# Patient Record
Sex: MALE | Race: WHITE | Employment: UNEMPLOYED | URBAN - METROPOLITAN AREA
[De-identification: names, ages, dates, MRNs, and addresses within clinical notes are randomized per-mention and may not be internally consistent; named-entity substitution may affect disease eponyms.]

---

## 2024-08-30 ENCOUNTER — HOSPITAL ENCOUNTER (EMERGENCY)
Age: 34
Discharge: HOME | End: 2024-08-30
Attending: EMERGENCY MEDICINE

## 2024-08-30 VITALS
SYSTOLIC BLOOD PRESSURE: 98 MMHG | BODY MASS INDEX: 28.44 KG/M2 | HEART RATE: 109 BPM | HEIGHT: 72 IN | WEIGHT: 210 LBS | RESPIRATION RATE: 16 BRPM | TEMPERATURE: 98.7 F | OXYGEN SATURATION: 95 % | DIASTOLIC BLOOD PRESSURE: 63 MMHG

## 2024-08-30 DIAGNOSIS — F11.20 FENTANYL DEPENDENCE (HCC): Primary | ICD-10-CM

## 2024-08-30 PROCEDURE — 99283 EMERGENCY DEPT VISIT LOW MDM: CPT

## 2024-08-30 ASSESSMENT — LIFESTYLE VARIABLES
HOW OFTEN DO YOU HAVE A DRINK CONTAINING ALCOHOL: NEVER
HOW MANY STANDARD DRINKS CONTAINING ALCOHOL DO YOU HAVE ON A TYPICAL DAY: PATIENT DOES NOT DRINK

## 2024-08-30 ASSESSMENT — PAIN - FUNCTIONAL ASSESSMENT
PAIN_FUNCTIONAL_ASSESSMENT: NONE - DENIES PAIN
PAIN_FUNCTIONAL_ASSESSMENT: NONE - DENIES PAIN

## 2024-08-30 NOTE — ED NOTES
Bedside and Verbal shift change report given to JAMIE Almeida (oncoming nurse) by Sue (offgoing nurse). Report included the following information ED SBAR and Neuro Assessment.

## 2024-08-30 NOTE — ED PROVIDER NOTES
Emergency Department Provider Note       PCP: No primary care provider on file.   Age: 33 y.o.   Sex: male     DISPOSITION    Condition at Disposition: Data Unavailable     No diagnosis found.    Medical Decision Making     Patient given emergency department for evaluation after taking fentanyl.  He currently has no complaints.  He will be obscuring the emergency department for period of time to ensure that he has no respiratory depression or hemodynamic instability.  At this time I do not think any blood work or imaging is needed.  ED Course as of 08/30/24 2148   Fri Aug 30, 2024   2146 Patient has been observed in the emergency department for over 3 hours now he is more awake at this time.  He is feeling better.  He feels comfortable with discharge.  He can be discharged and check into rehab. [APOLLO]      ED Course User Index  [APOLLO] Devante Valdez, DO     1 or more acute illnesses that pose a threat to life or bodily function.   Shared medical decision making was utilized in creating the patients health plan today.    I independently ordered and reviewed each unique test.                       History     Patient is a 33-year-old male with history of of opioid abuse.  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  He was scheduled to check into a detox center today.  Just prior to leaving to go to the detox center he had a brownie laced with fentanyl.  He told them what he did when he arrived.  They were concerned as \"he was nodding off\".  Therefore had him brought to the emergency department.  On arrival here patient has no complaints.  He is alert, oriented with normal work of breathing oxygenation.    The history is provided by the patient.     Physical Exam     Vitals signs and nursing note reviewed:  Vitals:    08/30/24 1809   BP: (!) 153/109   Pulse: (!) 109   Resp: 16   Temp: 98.7 °F (37.1 °C)   TempSrc: Oral   SpO2: 95%   Weight: 95.3 kg (210 lb)   Height: 1.829 m (6')

## 2024-08-30 NOTE — ED TRIAGE NOTES
Patient coming from [facility], pt stated he had a weed brownie and some fentanyl @ 1530  VSS per ems     Patient stated he was on his way to the rehab centre to be checked in when he took the drugs. [facility] sent him here to be evaluated.     Patient drowsy but easily aroused during triage